# Patient Record
Sex: MALE | Race: OTHER | HISPANIC OR LATINO | URBAN - METROPOLITAN AREA
[De-identification: names, ages, dates, MRNs, and addresses within clinical notes are randomized per-mention and may not be internally consistent; named-entity substitution may affect disease eponyms.]

---

## 2020-07-28 ENCOUNTER — EMERGENCY (EMERGENCY)
Facility: HOSPITAL | Age: 25
LOS: 1 days | Discharge: HOME | End: 2020-07-28
Attending: STUDENT IN AN ORGANIZED HEALTH CARE EDUCATION/TRAINING PROGRAM
Payer: SELF-PAY

## 2020-07-28 VITALS
RESPIRATION RATE: 16 BRPM | OXYGEN SATURATION: 98 % | HEIGHT: 71 IN | DIASTOLIC BLOOD PRESSURE: 69 MMHG | HEART RATE: 78 BPM | WEIGHT: 164.91 LBS | SYSTOLIC BLOOD PRESSURE: 115 MMHG | TEMPERATURE: 98 F

## 2020-07-28 PROCEDURE — 73564 X-RAY EXAM KNEE 4 OR MORE: CPT | Mod: 26,LT

## 2020-07-28 PROCEDURE — 73564 X-RAY EXAM KNEE 4 OR MORE: CPT

## 2020-07-28 PROCEDURE — 99283 EMERGENCY DEPT VISIT LOW MDM: CPT | Mod: 25

## 2020-07-28 PROCEDURE — 96372 THER/PROPH/DIAG INJ SC/IM: CPT

## 2020-07-28 PROCEDURE — 99284 EMERGENCY DEPT VISIT MOD MDM: CPT

## 2020-07-28 RX ORDER — IBUPROFEN 200 MG
1 TABLET ORAL
Qty: 28 | Refills: 0
Start: 2020-07-28 | End: 2020-08-03

## 2020-07-28 RX ORDER — KETOROLAC TROMETHAMINE 30 MG/ML
30 SYRINGE (ML) INJECTION ONCE
Refills: 0 | Status: DISCONTINUED | OUTPATIENT
Start: 2020-07-28 | End: 2020-07-28

## 2020-07-28 RX ADMIN — Medication 30 MILLIGRAM(S): at 12:25

## 2020-07-28 NOTE — ED PROVIDER NOTE - PROGRESS NOTE DETAILS
Wet read: Xray negative for fracture. Pt encouraged to use ibuprofen and/or tylenol as needed for pain control. Pt educated on RICE measures for pain relief including rest, ice applied to affected area, compression of area with ace wrap and elevation of extremity above heart level. Given crutches. Pt provided with referral for orthopedic doctor and instructed to follow-up within 1-2 weeks if symptoms persist.

## 2020-07-28 NOTE — ED PROVIDER NOTE - CLINICAL SUMMARY MEDICAL DECISION MAKING FREE TEXT BOX
24yo M c/o knee pain and pop sensation. knee sprain vs patella subluxation. Plan to check xray, toradol for pain, crutches/ace and  provide f/u ortho.

## 2020-07-28 NOTE — ED PROVIDER NOTE - OBJECTIVE STATEMENT
26yo M no pmhx presenting to ED c/o left knee pain. Pt states he was walking upstairs and thinks he stepped awkwardly on left leg causing his knee to buckle. Pt states he felt a "pop" sensation and pain. States he thinks his knee cap popped out and back in but has had pain since to left knee. No pain meds taken prior to arrival. Able to ambulate but states he is limping. Denies fall, erythema, warmth, swelling, calf pain, numbness, tingling, weakness, cp, sob, fever.

## 2020-07-28 NOTE — ED PROVIDER NOTE - PATIENT PORTAL LINK FT
You can access the FollowMyHealth Patient Portal offered by [facility] by registering at the following website: [email or web address]. By joining [facility]’s FollowMyHealth portal, you will also be able to view your health information using other applications (apps) compatible with our system. English

## 2020-07-28 NOTE — ED PROVIDER NOTE - PHYSICAL EXAMINATION
Gen: WD/WN, NAD, non-toxic  Head: NCAT  Cardiac: RRR +[id number].   Resp: ctab  MSK: LLE: No obvious deformities. FROM, Minimal joint laxity, Lachman negative. TTP along joint line and over patella. Compartments soft. DP pulses 2+ b/l, cap refill < 2sec. Pt partial weightbearing on LLE, ambulating with limp   Skin: Warm, dry  Neuro: Awake, alert & oriented x 3. No focal deficits, sensorimotor intact x 4

## 2020-07-28 NOTE — ED PROVIDER NOTE - ATTENDING CONTRIBUTION TO CARE
25 YOM no pmhx presenting to ED c/o left knee pain. Walking up stairs and felt pop sensation. Distal pulses intact, able to ambulate with limp. No dislocation noted.   AP - xray. ACE wrap. supportive care. outpatient ortho/sport f/u for MRI if symptoms persist

## 2020-07-28 NOTE — ED PROVIDER NOTE - CARE PROVIDER_API CALL
Gino Early  ORTHOPAEDIC SURGERY  [address] [place] [address]  Phone: [phone]  Fax: [phone]  Follow Up Time:

## 2020-07-28 NOTE — ED PROVIDER NOTE - NSFOLLOWUPINSTRUCTIONS_ED_ALL_ED_FT
- Follow up with your doctor within 2-3 days.   - Return to the ED for any new or worsening symptoms.   - Apply ice to affected area for 15-20 minutes every few hours for the next few days.  Use as much or as frequently as desired.   - May take ibuprofen 600mg every 6 hours as needed for pain control  - Elevate extremity while resting   - Rest affected area, avoid heavy lifting, exertion  - Follow-up with orthopedic doctor provided within 1-2 weeks for further evaluation if symptoms persist - Follow up with your doctor within 2-3 days.   - Return to the ED for any new or worsening symptoms.   - Apply ice to affected area for 15-20 minutes every few hours for the next few days.  Use as much or as frequently as desired.   - May take ibuprofen 600mg every 6 hours as needed for pain control  - Elevate extremity while resting   - Rest affected area, avoid heavy lifting, exertion  - Follow-up with orthopedic doctor provided within 1-2 weeks for further evaluation if symptoms persist    Sprain    A sprain is a stretch or tear in one of the tough, fiber-like tissues (ligaments) in your body. This is caused by an injury to the area such as a twisting mechanism. Symptoms include pain, swelling, or bruising. Rest that area over the next several days and slowly resume activity when tolerated. Ice can help with swelling and pain.     SEEK IMMEDIATE MEDICAL CARE IF YOU HAVE ANY OF THE FOLLOWING SYMPTOMS: worsening pain, inability to move that body part, numbness or tingling.